# Patient Record
Sex: MALE | Employment: PART TIME | URBAN - METROPOLITAN AREA
[De-identification: names, ages, dates, MRNs, and addresses within clinical notes are randomized per-mention and may not be internally consistent; named-entity substitution may affect disease eponyms.]

---

## 2019-05-21 ENCOUNTER — HOSPITAL ENCOUNTER (INPATIENT)
Age: 19
LOS: 2 days | Discharge: HOME | DRG: 885 | End: 2019-05-23
Attending: PSYCHIATRY & NEUROLOGY | Admitting: PSYCHIATRY & NEUROLOGY
Payer: COMMERCIAL

## 2019-05-21 PROBLEM — F32.A DEPRESSION: Status: ACTIVE | Noted: 2019-05-21

## 2019-05-21 PROCEDURE — 99221 1ST HOSP IP/OBS SF/LOW 40: CPT | Performed by: PHYSICIAN ASSISTANT

## 2019-05-21 PROCEDURE — 99223 1ST HOSP IP/OBS HIGH 75: CPT | Performed by: PSYCHIATRY & NEUROLOGY

## 2019-05-21 PROCEDURE — 6370000000 HC RX 637 (ALT 250 FOR IP): Performed by: PSYCHIATRY & NEUROLOGY

## 2019-05-21 PROCEDURE — 1240000000 HC EMOTIONAL WELLNESS R&B

## 2019-05-21 RX ORDER — CITALOPRAM HYDROBROMIDE 20 MG/10ML
20 SOLUTION, ORAL ORAL DAILY
Status: ON HOLD | COMMUNITY
End: 2019-05-21

## 2019-05-21 RX ORDER — VENLAFAXINE HYDROCHLORIDE 75 MG/1
75 CAPSULE, EXTENDED RELEASE ORAL
Status: DISCONTINUED | OUTPATIENT
Start: 2019-05-21 | End: 2019-05-23 | Stop reason: HOSPADM

## 2019-05-21 RX ORDER — MAGNESIUM HYDROXIDE/ALUMINUM HYDROXICE/SIMETHICONE 120; 1200; 1200 MG/30ML; MG/30ML; MG/30ML
30 SUSPENSION ORAL EVERY 6 HOURS PRN
Status: DISCONTINUED | OUTPATIENT
Start: 2019-05-21 | End: 2019-05-23 | Stop reason: HOSPADM

## 2019-05-21 RX ORDER — ACETAMINOPHEN 325 MG/1
650 TABLET ORAL EVERY 4 HOURS PRN
Status: DISCONTINUED | OUTPATIENT
Start: 2019-05-21 | End: 2019-05-23 | Stop reason: HOSPADM

## 2019-05-21 RX ORDER — HYDROXYZINE PAMOATE 25 MG/1
25 CAPSULE ORAL 3 TIMES DAILY PRN
Status: ON HOLD | COMMUNITY
End: 2019-05-23 | Stop reason: HOSPADM

## 2019-05-21 RX ORDER — HYDROXYZINE PAMOATE 50 MG/1
50 CAPSULE ORAL 3 TIMES DAILY PRN
Status: DISCONTINUED | OUTPATIENT
Start: 2019-05-21 | End: 2019-05-23 | Stop reason: HOSPADM

## 2019-05-21 RX ORDER — OLANZAPINE 5 MG/1
5 TABLET ORAL
Status: ACTIVE | OUTPATIENT
Start: 2019-05-21 | End: 2019-05-21

## 2019-05-21 RX ORDER — CITALOPRAM 20 MG/1
20 TABLET ORAL DAILY
Status: ON HOLD | COMMUNITY
End: 2019-05-23 | Stop reason: HOSPADM

## 2019-05-21 RX ORDER — GABAPENTIN 300 MG/1
300 CAPSULE ORAL 2 TIMES DAILY
Status: DISCONTINUED | OUTPATIENT
Start: 2019-05-21 | End: 2019-05-23 | Stop reason: HOSPADM

## 2019-05-21 RX ORDER — BISACODYL 10 MG
10 SUPPOSITORY, RECTAL RECTAL DAILY PRN
Status: DISCONTINUED | OUTPATIENT
Start: 2019-05-21 | End: 2019-05-23 | Stop reason: HOSPADM

## 2019-05-21 RX ORDER — BUSPIRONE HYDROCHLORIDE 5 MG/1
5 TABLET ORAL 2 TIMES DAILY
Status: ON HOLD | COMMUNITY
End: 2019-05-23 | Stop reason: HOSPADM

## 2019-05-21 RX ORDER — IBUPROFEN 400 MG/1
400 TABLET ORAL EVERY 6 HOURS PRN
Status: DISCONTINUED | OUTPATIENT
Start: 2019-05-21 | End: 2019-05-23 | Stop reason: HOSPADM

## 2019-05-21 RX ORDER — TRAZODONE HYDROCHLORIDE 50 MG/1
50 TABLET ORAL NIGHTLY PRN
Status: DISCONTINUED | OUTPATIENT
Start: 2019-05-21 | End: 2019-05-23 | Stop reason: HOSPADM

## 2019-05-21 RX ADMIN — VENLAFAXINE HYDROCHLORIDE 75 MG: 75 CAPSULE, EXTENDED RELEASE ORAL at 12:20

## 2019-05-21 RX ADMIN — GABAPENTIN 300 MG: 300 CAPSULE ORAL at 12:20

## 2019-05-21 RX ADMIN — GABAPENTIN 300 MG: 300 CAPSULE ORAL at 21:07

## 2019-05-21 RX ADMIN — TRAZODONE HYDROCHLORIDE 50 MG: 50 TABLET ORAL at 21:07

## 2019-05-21 SDOH — HEALTH STABILITY: MENTAL HEALTH: HOW OFTEN DO YOU HAVE A DRINK CONTAINING ALCOHOL?: NEVER

## 2019-05-21 ASSESSMENT — SLEEP AND FATIGUE QUESTIONNAIRES
RESTFUL SLEEP: NO
SLEEP PATTERN: DIFFICULTY FALLING ASLEEP;DIFFICULTY ARISING
SLEEP PATTERN: DIFFICULTY FALLING ASLEEP
DIFFICULTY FALLING ASLEEP: YES
DO YOU HAVE DIFFICULTY SLEEPING: YES
AVERAGE NUMBER OF SLEEP HOURS: 4
DO YOU USE A SLEEP AID: NO
DIFFICULTY STAYING ASLEEP: NO
RESTFUL SLEEP: NO
AVERAGE NUMBER OF SLEEP HOURS: 4
DIFFICULTY FALLING ASLEEP: YES
DIFFICULTY ARISING: YES
DIFFICULTY ARISING: NO
DO YOU HAVE DIFFICULTY SLEEPING: YES
DIFFICULTY STAYING ASLEEP: NO

## 2019-05-21 ASSESSMENT — LIFESTYLE VARIABLES
HISTORY_ALCOHOL_USE: NO
HISTORY_ALCOHOL_USE: NO

## 2019-05-21 ASSESSMENT — PATIENT HEALTH QUESTIONNAIRE - PHQ9: SUM OF ALL RESPONSES TO PHQ QUESTIONS 1-9: 16

## 2019-05-21 NOTE — BH NOTE
Therapist completed psycho social assessment and C-SSRS on Pt. Pt reports no current SI. Pt is employed.   Pt is interested in [facility] but lives too far to attend [facility]'s program.

## 2019-05-21 NOTE — H&P
INITIAL PSYCHIATRIC HISTORY AND PHYSICAL      Patient name: Debbie Abdi  Admit date: 5/21/2019  Today's date: 5/21/2019           CC:  Depression and s/p OD    HPI:   Patient seen in room on Adult Behavioral Unit. Patient is a 22 y.o. male who presents  After he od on his meds. Pt stated that he has been having worsening depression and anxiety for the past 3 days. Pt feels that it has intensify since they switch his meds to buspar and hydroxyzine. Pt stated that he feels dep, anhedonia, dec motivation, inc impulsivity, si fleeting thoughts. No psychosis vani or hypomania reported. Pt stated that main sx is anxiety and he stated that it is a self of impending doom. Pt stated that he always thinks on worst case scenario and he cant get it out of his head. PAST PSYCHIATRIC HISTORY:  No previous admission, no previous attempts but did hit himself a yr ago as a self injurious behaviors    FAMILY PSYCHIATRIC HISTORY: mom has addiction disorder  ALLERGIES:  No Known Allergies    CURRENT MEDICATIONS:     venlafaxine  75 mg Oral Daily with breakfast    gabapentin  300 mg Oral BID       PAST MEDICAL HISTORY:  History reviewed. No pertinent past medical history. PAST SURGICAL HISTORY:  History reviewed. No pertinent surgical history. PROBLEM LIST:  Active Problems:    Depression  Resolved Problems:    * No resolved hospital problems. *       SOCIAL HISTORY:  Pt lives with aunt and uncle and 15 y/o sister, he stays weekend with dad. Pt has GF no kids. Pt works at day care. Hs grad. No hx of abuse.  No legal issues  Social History     Socioeconomic History    Marital status: Single     Spouse name: Not on file    Number of children: Not on file    Years of education: 15    Highest education level: Not on file   Occupational History    Not on file   Social Needs    Financial resource strain: Not on file    Food insecurity:     Worry: Not on file     Inability: Not on file   [employer] needs: Medical: Not on file     Non-medical: Not on file   Tobacco Use    Smoking status: Never Smoker    Smokeless tobacco: Never Used   Substance and Sexual Activity    Alcohol use: Never     Frequency: Never    Drug use: Never    Sexual activity: Yes     Partners: Female   Lifestyle    Physical activity:     Days per week: Not on file     Minutes per session: Not on file    Stress: Not on file   Relationships    Social connections:     Talks on phone: Not on file     Gets together: Not on file     Attends [employer] service: Not on file     Active member of club or organization: Not on file     Attends meetings of clubs or organizations: Not on file     Relationship status: Not on file    Intimate partner violence:     Fear of current or ex partner: Not on file     Emotionally abused: Not on file     Physically abused: Not on file     Forced sexual activity: Not on file   Other Topics Concern    Not on file   Social History Narrative    Not on file       OBJECTIVE:   Vitals:    05/21/19 0703   BP: 127/64   Pulse: (!) 48   Resp: 14   Temp: 97.7 °F (36.5 °C)   SpO2: 97%       REVIEW OF SYSTEMS:   Reports no current cardiovascular, respiratory, gastrointestinal, genitourinary,   integumentary, neurological, musculoskeletal, or immunological symptoms today. PSYCHIATRIC:  See HPI above     PSYCHIATRIC EXAMINATION / MENTAL STATUS EXAM:     CONSTITUTIONAL:    Vitals:    Blood pressure 127/64, pulse (!) 48, temperature 97.7 °F (36.5 °C), temperature source Oral, resp. rate 14, SpO2 97 %.   General appearance:  [x]  appears age, []  appears older than stated age,     [x]  adequately dressed and groomed, [] disheveled,                [x]  in no acute distress, [] appears mildly distressed,      []  Other:      MUSCULOSKELETAL:   Gait:   [x] normal, [] antalgic, [] unsteady, [] in bed, gait not evaluated   Station:  [] erect, [] sitting, [] recumbent, [] other        Strength/tone:  [x] muscle strength and tone appear consistent with age and condition     [] atrophy      [] abnormal movements  PSYCHIATRIC:    Relatedness:  [x] cooperative, [] guarded, [] indifferent, [] hostile,      [] sedated  Speech:  [x] normal prosody, rate and volume [] pressured, [] decreased volume,    [] slurred, [] halting, [] slowed, [] other     [] echolalia, [] incoherent, [] stuttering   Eye contact:  [] direct, [x] avoidant, [] intense  Kinetics:  [] normal, [] increased, [x] decreased  Mood:   [] stable, [x] depressed, [x] anxious, [] irritable,     [] labile  Affect:   [] normal range, [] constricted, [] depressed, [] anxious,     [] angry, [x] blunted  Hallucinations  [x] denies, [] auditory,  [] visual,  [] olfactory, [] tactile  Delusions  [x] none, [] grandiose,  [] jealous,  [] persecutory,  [] somatic,     [] bizarre  Preoccupations  [x] none, [] violence, [] obsessions, [] other     Suicidal ideation  [x] denies, [] endorses  Homicidal ideation [x] denies, [] endorses  Thought process: [x] logical, [] circumstantial, [] tangential, [] ROSA,     [] simplistic, [] disorganized  Associations:  [x] logical and coherent, [] loosening, [] disorganized  Insight:   [] good, [] fair, [x] poor  Judgment:  [] good, [] fair, [x] poor  Attention and concentration:     [] intact, [x] limited, [] able to focus on interview,     [] grossly impaired  Orientation:  [x] person, place, time, situation     [] disoriented to:     Memory:  Remote memory [x] intact, [] impaired     Recent memory  [x] intact, [] impaired          IMPRESSION    Axis I: mdd recurrent moderate, silverio  Axis II: deferred  Axis III: see medical hx  Axis IV: poor coping skills, relational problems      PLAN   1. Admit to Adult Behavioral Unit / Senior Behavioral Unit  2. Consult Internal Medicine to evaluate and treat medical conditions  3. Adjust psychotropic medications to target symptoms stop buspar and hydroxyzine, start effexor and gabapentin  4.   Occupational Therapy, Physical Therapy, Group Psychotherapy as tolerated   5. Reviewed treatment plan with patient including medication risks, benefits, side effects. Obtained informed consent for treatment.        Spent at least 70 minutes with evaluation process and more than 50% of the time was spent obtaining history and planning treatment with the patient

## 2019-05-21 NOTE — BH NOTE
Therapist invited pt to attended the 11:00AM Psychotherapy group; pt declined invitation and did not attend.     Kirt Crowder MA, R-JOSEPT, LPCC-S

## 2019-05-21 NOTE — BH NOTE
No precert information available. No insurance noted in chart unavailable to complete precert. Twan Nunez notified.   Gadiel Gerard BSN, RN-BC

## 2019-05-21 NOTE — PROGRESS NOTES
Patient in [employer] visiting with sister. Gave patient new medictions and reviewed. Patient indicated understanding. Gennaro Randolph

## 2019-05-21 NOTE — H&P
Hospital Medicine History & Physical      PCP: No primary care provider on file. Date of Admission: 5/21/2019    Date of Service: Pt seen/examined on 5/21/2019    Chief Complaint:  SI    History Of Present Illness: The patient is a 22 y.o. male with depression who presented to [facility] ED with complaint of SI and depression. Patient was seen and evaluated in the ED by the ED medical provider, patient was medically cleared for admission to [facility] at [facility]. This note serves as an admission medical H&P.    PCP: No primary care provider on file. Tobacco use: denies   ETOH use: denies   Illicit drug use: denies    Patient denies any medical complaints. Past Medical History:    History reviewed. No pertinent past medical history. Past Surgical History:    History reviewed. No pertinent surgical history. Medications Prior to Admission:    Prior to Admission medications    Medication Sig Start Date End Date Taking? Authorizing Provider   busPIRone (BUSPAR) 5 MG tablet Take 5 mg by mouth 2 times daily   Yes Historical Provider, MD   hydrOXYzine (VISTARIL) 25 MG capsule Take 25 mg by mouth 3 times daily as needed for Anxiety   Yes Historical Provider, MD   citalopram (CELEXA) 20 MG tablet Take 20 mg by mouth daily   Yes Historical Provider, MD       Allergies:  Patient has no known allergies. Social History:  The patient currently lives with aunt and uncle and sister    TOBACCO:   reports that he has never smoked. He has never used smokeless tobacco.  ETOH:   reports that he does not drink alcohol. Family History:   Positive as follows:    History reviewed. No pertinent family history.     REVIEW OF SYSTEMS:     Constitutional: Negative for fever   HENT: Negative for sore throat   Eyes: Negative for redness   Respiratory: Negative  for dyspnea, cough   Cardiovascular: Negative for chest pain   Gastrointestinal: Negative for vomiting, diarrhea   Genitourinary: Negative for hematuria   Musculoskeletal: Negative for arthralgias   Skin: Negative for rash   Neurological: Negative for syncope    Hematological: Negative for easy bruising/bleeding   Psychiatric/Behavorial: Per psychiatry team evaluation     PHYSICAL EXAM:    /64   Pulse (!) 48 Comment: verified manually  Temp 97.7 °F (36.5 °C) (Oral)   Resp 14   SpO2 97%   Gen: No distress. Alert. Eyes: PERRL. No sclera icterus. No conjunctival injection. ENT: No discharge. Pharynx clear. Neck: No JVD. No Carotid Bruit. Trachea midline. Resp: No accessory muscle use. No crackles. No wheezes. No rhonchi. CV: Bradycardia. Regular rhythm. No murmur. No rub. No edema. GI: Non-tender. Non-distended. Normal bowel sounds. Skin: Warm and dry. No nodule on exposed extremities. No rash on exposed extremities. M/S: No cyanosis. No joint deformity. No clubbing. Neuro: Awake. No focal neurologic deficit on exam.  Cranial nerves II through XII intact. Patient is able to ambulate without difficulty.   Psych: Per psychiatry team evaluation     OSH labs wnl     CULTURES  None     EKG:  I have reviewed the EKG with the following interpretation:   OSH--> sinus bradycardia, normal axis, normal interval, no acute ST segment changes     RADIOLOGY  No orders to display       Pertinent previous results reviewed   None    ASSESSMENT/PLAN:  MDD  - per psychiatry team  - Took #5 5mg Buspar tablets around 3 pm on 5/20, still within normal dosing range    - 1/2 life of Buspar is 2-3 hours, now ~ 24 hours post ingestion     Bradycardia   - young, healthy male  - Asx   - monitor     Ramu Stephenson PA-C  5/21/2019 1:35 PM

## 2019-05-21 NOTE — BH NOTE
Writer completed pts leisure assessment. Pt states he spends time with girlfriend, listens to music, plays guitar in his free time. States he has family and girlfriend for support. Pt states that he works at . Pt states that he has anxiety and \"a lot of worries\".  States he began a new depression medication and became suicidal.     Sylvie Eugene MA, Mona Ratliff

## 2019-05-21 NOTE — BH NOTE
[facility]  Initial Interdisciplinary Treatment Plan NOTE    Review Date & Time: 5/21/19 1000    Patient was not in treatment team    Admission Type:   Admission Type: Involuntary    Reason for admission:  Reason for Admission: suicide attempt by overdosing on medications      Estimated Length of Stay Update:  3-5 days  Estimated Discharge Date Update: 5/24/19-5/26/19    PATIENT STRENGTHS:  Patient Strengths Strengths: Communication, No significant Physical Illness, Employment, Medication Compliance, Positive Support  Patient Strengths and Limitations:Limitations: Tendency to isolate self  Addictive Behavior:Addictive Behavior  In the past 3 months, have you felt or has someone told you that you have a problem with:  : None  Do you have a history of Chemical Use?: No  Do you have a history of Alcohol Use?: No  Do you have a history of Street Drug Abuse?: No  Histroy of Prescripton Drug Abuse?: No  Medical Problems:History reviewed. No pertinent past medical history.     EDUCATION:   Learner Progress Toward Treatment Goals: Reviewed goals and plan of care    Method: Individual    Outcome: Verbalized understanding    PATIENT GOALS: none    PLAN/TREATMENT RECOMMENDATIONS UPDATE:evaluate for treatment    GOALS UPDATE:   Time frame for Short-Term Goals: 2 days    Sonu Otto RN

## 2019-05-21 NOTE — BH NOTE
Treatment Center                                           ( ) Patient refused counseling  ( ) Patient has not smoked in the last 30 days    Metabolic Screening:    No results found for: LABA1C    No results found for: CHOL  No results found for: TRIG  No results found for: HDL  No components found for: LDLCAL  No results found for: LABVLDL      There is no height or weight on file to calculate BMI. BP Readings from Last 2 Encounters:   05/21/19 127/64           Pt admitted with followings belongings:  Dentures: None  Vision - Corrective Lenses: None  Jewelry: None  Body Piercings Removed: N/A  Clothing: Pants, Shirt, Socks, Footwear, None(cap)  Were All Patient Medications Collected?: Not Applicable  Other Valuables: Cell phone     Valuables placed in locker. Cell phone in safe. Patient's home medications were none. Patient oriented to surroundings and program expectations and copy of patient rights given. Received admission packet:  yes. Consents reviewed, signed yes. Refused none. Patient verbalize understanding:  yes.     Patient education on precautions: yes                   Devendra Carey RN

## 2019-05-22 PROCEDURE — 6370000000 HC RX 637 (ALT 250 FOR IP): Performed by: PSYCHIATRY & NEUROLOGY

## 2019-05-22 PROCEDURE — 1240000000 HC EMOTIONAL WELLNESS R&B

## 2019-05-22 PROCEDURE — 99233 SBSQ HOSP IP/OBS HIGH 50: CPT | Performed by: PSYCHIATRY & NEUROLOGY

## 2019-05-22 RX ADMIN — GABAPENTIN 300 MG: 300 CAPSULE ORAL at 08:56

## 2019-05-22 RX ADMIN — VENLAFAXINE HYDROCHLORIDE 75 MG: 75 CAPSULE, EXTENDED RELEASE ORAL at 08:56

## 2019-05-22 RX ADMIN — GABAPENTIN 300 MG: 300 CAPSULE ORAL at 21:13

## 2019-05-22 ASSESSMENT — ENCOUNTER SYMPTOMS
SORE THROAT: 0
CONSTIPATION: 0
ABDOMINAL PAIN: 0
BACK PAIN: 0
DIARRHEA: 0
SHORTNESS OF BREATH: 0
CHEST TIGHTNESS: 0
NAUSEA: 0

## 2019-05-22 NOTE — PLAN OF CARE
Problem: Suicide risk  Goal: Provide patient with safe environment  Description  Provide patient with safe environment  Outcome: Ongoing     Problem: Altered Mood, Depressive Behavior:  Goal: Able to verbalize acceptance of life and situations over which he or she has no control  Description  Able to verbalize acceptance of life and situations over which he or she has no control  Outcome: Ongoing  Goal: Able to verbalize and/or display a decrease in depressive symptoms  Description  Able to verbalize and/or display a decrease in depressive symptoms  Outcome: Ongoing   Pt stated that he felt it is beneficial for him to be here. Fels that he is learning better how to cope. States he's learned to focus on self so he can be helpful to others. Denies Si. Has been out on the unit a moderate amount tonight but not yet interacting with others. Cooperative and seems to be getting some insight.

## 2019-05-22 NOTE — PROGRESS NOTES
Department of Psychiatry  Attending Progress Note  Chief Complaint: follow-up Pt stated that been here has been a wake up call for him. He stated that he feels anger towards himself for od and hurting his family. Pt stated that grps have been very helpful. Will cont meds as prescribed. Patient's chart was reviewed and collaborated with  about the treatment plan. SUBJECTIVE:    Patient is feeling better. Suicidal ideation:  denies suicidal ideation. Patient does not have medication side effects. ROS: Patient has new complaints: no  Review of Systems   Constitutional: Negative for activity change and appetite change. HENT: Negative for congestion and sore throat. Eyes: Negative for visual disturbance. Respiratory: Negative for chest tightness and shortness of breath. Cardiovascular: Negative for chest pain. Gastrointestinal: Negative for abdominal pain, constipation, diarrhea and nausea. Musculoskeletal: Negative for back pain and gait problem. Neurological: Negative for dizziness, tremors and headaches. Psychiatric/Behavioral: Positive for behavioral problems. Negative for decreased concentration, sleep disturbance and suicidal ideas. The patient is not nervous/anxious. Sleeping adequately:  Yes   Appetite adequate: Yes  Attending groups: Yes  Visitors:No    OBJECTIVE    Physical  VITALS:  /67   Pulse 77   Temp 97.9 °F (36.6 °C) (Oral)   Resp 14   SpO2 97%     Mental Status Examination:  Patients appearance was well-appearing, street clothes, in chair, good grooming and good hygiene. Thoughts are Logical. Homicidal ideations none. No abnormal movements, tics or mannerisms. Memory intact Aims 0. Concentration Good. Alert and oriented X 4. Insight and Judgement fair. Patient was cooperative.  Patient gait normal. Mood I am mad at myself, affect normal affect Hallucinations Absent, suicidal ideations no specific plan to harm self Speech fluent and

## 2019-05-22 NOTE — PLAN OF CARE
Problem: Suicide risk  Goal: Provide patient with safe environment  Description  Provide patient with safe environment  5/22/2019 1036 by Christiane Orourke RN  Outcome: Ongoing     Problem: Altered Mood, Depressive Behavior:  Goal: Able to verbalize and/or display a decrease in depressive symptoms  Description  Able to verbalize and/or display a decrease in depressive symptoms  5/22/2019 1036 by Christiane Orourke RN  Outcome: Ongoing   S: \"I'm learning some things here about how to cope. I had been dating my girlfriend for a year. \"  O: Visible on the unit and attending group activities. Affect is blunted. Eye contact direct. Talking to family via phone. Med compliant. A: No current suicidal ideation noted. Interactive and cooperative on approach. P: Continue as formulated. 1:1 interactions to administer meds, encourage group attention and discuss pt progress since admission.

## 2019-05-22 NOTE — GROUP NOTE
Group Therapy Note    Date: May 22    Group Start Time: 1000  Group End Time: 1100  Group Topic: Psychoeducation    [facility]    Ruba Hunter        Group Therapy Note    Attendees: 9    Group Topic: Group members were encouraged to engage in an art therapy directive focusing on identifying emotions and situations that cause specific emotions. Pts were encouraged to share their thoughts and feelings with the group in an effort to gain further insight into how to better manage emotions. Notes:  Herson Martinez displayed positive ability to engage in the art therapy directive and the group discussion. He was able to identify specific emotions and discuss challenges related to communicating emotions with others. The group also focused on discussing the role that trust plays in sharing emotions with others. Status After Intervention:  Improved    Participation Level:  Active Listener and Interactive    Participation Quality: Appropriate, Attentive, Sharing and Supportive      Speech:  normal      Thought Process/Content: Logical  Linear      Affective Functioning: Constricted/Restricted      Mood: anxious and depressed  (decreased)    Level of consciousness:  Alert, Oriented x4 and Attentive      Response to Learning: Able to verbalize current knowledge/experience, Able to verbalize/acknowledge new learning, Able to retain information and Progressing to goal      Endings: None Reported    Modes of Intervention: Education, Support, Exploration, Clarifying, Problem-solving and Activity      Discipline Responsible: Psychoeducational Specialist      Signature:  Ryann Riddle MS, ATR-BC

## 2019-05-22 NOTE — PLAN OF CARE
[facility]  Initial Interdisciplinary Treatment Plan NOTE    Review Date & Time: 5/22/19 1000    Patient was not in treatment team    Admission Type:   Admission Type: Involuntary    Reason for admission:  Reason for Admission: suicide attempt by overdosing on medications      Estimated Length of Stay Update:  1-2 days  Estimated Discharge Date Update: 5/22/19-5/23/19    PATIENT STRENGTHS:  Patient Strengths Strengths: Communication, No significant Physical Illness, Employment, Medication Compliance, Positive Support  Patient Strengths and Limitations:Limitations: Tendency to isolate self  Addictive Behavior:Addictive Behavior  In the past 3 months, have you felt or has someone told you that you have a problem with:  : None  Do you have a history of Chemical Use?: No  Do you have a history of Alcohol Use?: No  Do you have a history of Street Drug Abuse?: No  Histroy of Prescripton Drug Abuse?: No  Medical Problems:History reviewed. No pertinent past medical history.     EDUCATION:   Learner Progress Toward Treatment Goals: Reviewed goals and plan of care    Method: Individual    Outcome: Verbalized understanding    PATIENT GOALS: \" have another mental successful day\"    PLAN/TREATMENT RECOMMENDATIONS UPDATE:continue to treat possible discharge    GOALS UPDATE:   Time frame for Short-Term Goals: today    Tomeka Tsai RN

## 2019-05-22 NOTE — GROUP NOTE
Group Therapy Note    Date: May 22    Group Start Time: 1600  Group End Time: 1700  Group Topic: Healthy Living/Wellness    [address]        Group Therapy Note    Attendees: Patients engaged in physical and mental activities such as, playing Wii, free writing, coloring, and socializing with peers          Status After Intervention:  Unchanged    Participation Level: Interactive    Participation Quality: Appropriate      Speech:  normal      Thought Process/Content: Logical      Affective Functioning: Congruent      Mood: euthymic      Level of consciousness:  Alert      Response to Learning: Able to verbalize current knowledge/experience      Endings: None Reported    Modes of Intervention: Socialization, Activity and Movement      Discipline Responsible: Registered Nurse      Signature:  Edis Malin RN

## 2019-05-22 NOTE — GROUP NOTE
Group Therapy Note    Date: May 22    Group Start Time: 1115  Group End Time: 1200  Group Topic: Psychotherapy    [address], LSW; Lion Reese [facility]        Group Therapy Note    Attendees: 8         Patient's Goal:  To open more, become more open minded,and share    Notes:  Pt meet goal in psychotherapy by opening up to the group and actively listening to other group members. Pt was engaged in group and gave supportive feedback and insight to other group members. Status After Intervention:  Improved    Participation Level: Active Listener and Interactive    Participation Quality: Appropriate, Attentive, Sharing and Supportive      Speech:  normal      Thought Process/Content: Logical      Affective Functioning: Constricted/Restricted      Mood: anxious and depressed      Level of consciousness:  Alert      Response to Learning: Able to verbalize current knowledge/experience, Able to verbalize/acknowledge new learning and Able to retain information      Endings: None Reported    Modes of Intervention: Education, Support, Socialization, Exploration, Clarifying and Problem-solving      Discipline Responsible: /Counselor      Signature:   DON Khan

## 2019-05-22 NOTE — GROUP NOTE
Group Therapy Note    Date: May 21    Group Start Time: 2015  Group End Time: 2100  Group Topic: Wrap-Up    [facility]        Group Therapy Note    Attendees:  goals and the importance of goal setting discussed. Night time milieu activities discussed. Patient's Goal:  Learn new coping skills    Notes:   Will play guitar when he catches himself \"chasing his tail\"    Status After Intervention:  Improved    Participation Level: Interactive    Participation Quality: Appropriate      Speech:  normal      Thought Process/Content: Logical      Affective Functioning: Congruent      Mood: good      Level of consciousness:  Alert and Oriented x4      Response to Learning: Progressing to goal      Endings: None Reported    Modes of Intervention: Support      Discipline Responsible: Behavorial Health Tech      Signature:  Vannesa Saeed

## 2019-05-22 NOTE — GROUP NOTE
Group Therapy Note    Date: May 22    Group Start Time: 0115  Group End Time: 0200  Group Topic: Cognitive Skills    [address] [place], [place]        Group Therapy Note    Attendees: 8         Discussed communication skills using DEAR MAN and discussed self soothing techniques in times of high stress    Notes: Pt was fully engaged in group by actively listening to group discussion. Pt contributed to group discussion by giving examples and feedback. Pt took notes in group as well. Status After Intervention:  Improved    Participation Level: Active Listener and Interactive    Participation Quality: Appropriate and Attentive      Speech:  normal      Thought Process/Content: Logical      Affective Functioning: Constricted/Restricted      Mood: anxious      Level of consciousness:  Alert      Response to Learning: Able to verbalize current knowledge/experience, Able to verbalize/acknowledge new learning and Able to retain information      Endings: None Reported    Modes of Intervention: Education, Clarifying and Problem-solving      Discipline Responsible: /Counselor      Signature:   Lonna Jeans, LSW

## 2019-05-23 VITALS
DIASTOLIC BLOOD PRESSURE: 72 MMHG | SYSTOLIC BLOOD PRESSURE: 119 MMHG | RESPIRATION RATE: 14 BRPM | HEART RATE: 75 BPM | TEMPERATURE: 97.9 F | OXYGEN SATURATION: 97 %

## 2019-05-23 PROCEDURE — 99239 HOSP IP/OBS DSCHRG MGMT >30: CPT | Performed by: PSYCHIATRY & NEUROLOGY

## 2019-05-23 PROCEDURE — 6370000000 HC RX 637 (ALT 250 FOR IP): Performed by: PSYCHIATRY & NEUROLOGY

## 2019-05-23 PROCEDURE — 5130000000 HC BRIDGE APPOINTMENT

## 2019-05-23 RX ORDER — GABAPENTIN 300 MG/1
300 CAPSULE ORAL 2 TIMES DAILY
Qty: 60 CAPSULE | Refills: 0 | Status: SHIPPED | OUTPATIENT
Start: 2019-05-23 | End: 2019-06-22

## 2019-05-23 RX ORDER — VENLAFAXINE HYDROCHLORIDE 75 MG/1
75 CAPSULE, EXTENDED RELEASE ORAL
Qty: 30 CAPSULE | Refills: 0 | Status: SHIPPED | OUTPATIENT
Start: 2019-05-24

## 2019-05-23 RX ADMIN — TRAZODONE HYDROCHLORIDE 50 MG: 50 TABLET ORAL at 00:10

## 2019-05-23 RX ADMIN — VENLAFAXINE HYDROCHLORIDE 75 MG: 75 CAPSULE, EXTENDED RELEASE ORAL at 08:28

## 2019-05-23 RX ADMIN — GABAPENTIN 300 MG: 300 CAPSULE ORAL at 08:28

## 2019-05-23 NOTE — PLAN OF CARE
Pt has been visible on unit, very social. Animated, smiled frequently. Pleasant. Stated that had a great day, that was even better than yesterday. Feels old meds didn't work and is very pleased with current med regime, plans to cont'. Said that being her ehas helped, ,that has learned coping skills. Reported that his father will pick him up tomorrow leonard, possibly with aunt, and take home. Heladio Snowball that he is doing so well that he feels that he is \"kind of their drive\" -that other pt look to him and his supportiveness and are motivated. He said that he has helped a lot of others here. A brenda grandiose. 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 Had big smile on face, almost euphoric as talked. Denied SI/HI. Denied hallucinations, paranoia.

## 2019-05-23 NOTE — FLOWSHEET NOTE
Group Therapy Note    Date: 5/23/2019  Start Time: 1330  End Time:  1430  Number of Participants: 9    Type of Group: Music Group    Notes:  Pt present for Music Group. Pt actively participated and was engaged, making song selections and singing along. Participation Level:  Active Listener and Interactive    Participation Quality: Appropriate, Attentive and Sharing      Speech:  normal      Affective Functioning: Congruent      Endings: None Reported    Modes of Intervention: Support, Socialization, Activity and Media      Discipline Responsible: Chaplain Lilliam Mary       05/23/19 1630   Encounter Summary   Services provided to: Patient   Continue Visiting   (5/23 Music Group)   Complexity of Encounter Moderate   Length of Encounter 1 hour

## 2019-05-23 NOTE — GROUP NOTE
Group Therapy Note    Date: May 23    Group Start Time: 1000  Group End Time: [address] [facility]  Group Topic: Psychoeducation    [address] [place], [place]        Group Therapy Note    Attendees: 10    Patient's Goal: To achieve a relaxed state, through aroma therapy and progressive muscle relaxation exercise. Notes: Pt engaged in group activity. Pt was able to achieve a relaxed state. Pt was given resources to materials used in group session. Status After Intervention:  Improved    Participation Level:  Active Listener and Interactive    Participation Quality: Appropriate, Attentive and Sharing      Speech:  normal      Thought Process/Content: Logical      Affective Functioning: Congruent      Mood: depressed      Level of consciousness:  Alert and Oriented x4      Response to Learning: Able to retain information and Capable of insight      Endings: None Reported    Modes of Intervention: Education, Support, Socialization and Activity      Discipline Responsible: Psychoeducational Specialist      Signature:  Sylvie Eugene MA, CTRS

## 2019-05-23 NOTE — DISCHARGE SUMMARY
Discharge Summary   Admit Date: 5/21/2019   Discharge Date:    5/23/2019  Spent over 40 minutes with patient and staff on [address] [facility]   Final Dx:     Axis I: mdd recurrent moderate, silverio  Axis II: deferred  Axis III: see medical hx  Axis IV: poor coping skills, relational problems      Condition on DC  Mood and affect are stable and pt is not suicidal   VITALS:  /72   Pulse 75   Temp 97.9 °F (36.6 °C) (Oral)   Resp 14   SpO2 97%   Brief Summary Present Illness   Patient is a 22 y.o. male who presents  After he od on his meds. Pt stated that he has been having worsening depression and anxiety for the past 3 days. Pt feels that it has intensify since they switch his meds to buspar and hydroxyzine. Pt stated that he feels dep, anhedonia, dec motivation, inc impulsivity, si fleeting thoughts. No psychosis vani or hypomania reported. Pt stated that main sx is anxiety and he stated that it is a self of impending doom. Pt stated that he always thinks on worst case scenario and he cant get it out of his head.        Hospital Course Pt was admitted for depression and od on his meds. Pt stated that it was an impulsive act. Pt was started on effexor and gabapentin which he tolerated well and had good response. Pt attended grps and was social with peers and staff. Patient appears to be in stable condition and close to their baseline functioning. The patient denies suicidal or homicidal ideations and is showing future orientation. Patient no longer presented an imminent risk of danger to themselves and/or others. At the time of discharge it appears that the patient has received the maximum medical benefit from this hospitalization and can be appropriately managed with community treatment. PE: (reviewed) and labs (see medical H&PE)  Labs:    No visits with results within 2 Day(s) from this visit. Latest known visit with results is:   No results found for any previous visit.         Mental Status Exam at Discharge:  Level of consciousness:  awake  Appearance:  well-appearing, in chair, good grooming and good hygiene well-developed, well-nourished  Behavior/Motor:  no abnormalities noted normal gait and station AIMS: 0  Attitude toward examiner:  cooperative, attentive and good eye contact  Speech:  spontaneous, normal rate, normal volume and well articulated  Mood:  dysthymic  Affect:  mood congruent Anxiety: mild  Hallucinations: Absent  Thought processes:  coherent Attention span, Concentration & Attention:  attention span and concentration were age appropriate  Thought content:  Reality based no evidence of delusions OCD: none    Insight: normal insight and judgment Cognition:  oriented to person, place, and time  Fund of Knowledge: average  IQ:average Memory: intact  Suicide:  No specific plan to harm self  Sleep: sleeps through the night  Appetite: ok   Reassess Gl[place]y Risk:  no specific plan to harm self Pt has phone numbers to contact if suicidal thoughts recur and states pt will return to the hospital if suicidal feelings return. Hospital Routine Meds:     venlafaxine  75 mg Oral Daily with breakfast    gabapentin  300 mg Oral BID      Hospital PRN Meds: acetaminophen, ibuprofen, hydrOXYzine, traZODone, magnesium hydroxide, bisacodyl, aluminum & magnesium hydroxide-simethicone   Discharge Meds:    Current Discharge Medication List           Details   venlafaxine (EFFEXOR XR) 75 MG extended release capsule Take 1 capsule by mouth daily (with breakfast)  Qty: 30 capsule, Refills: 0      gabapentin (NEURONTIN) 300 MG capsule Take 1 capsule by mouth 2 times daily for 30 days.   Qty: 60 capsule, Refills: 0                   Disposition - Residence         Follow Up:  See Discharge Instructions

## 2019-05-23 NOTE — BH NOTE
Time: 1600      Type of Group: WELLNESS      Level of Participation: ACTIVE      Comments: INTERACTING APPROPRIATELY

## 2019-05-23 NOTE — PLAN OF CARE
Problem: Suicide risk  Description  Suicide risk  Goal: Provide patient with safe environment  Description  Provide patient with safe environment  Note:   Patient safety maintained. Q15 minute checks continue. Patient denies SI/HI/AVH and is medication compliant this morning. He has elated mood stating \"I feel fantastic, the best vivian felt in years. \"  States he feels he is ready for discharge and would like to continue outpatient services in group setting as he feels this has been beneficial while inpatient. This information given to SW to help provide follow up.

## 2019-05-23 NOTE — GROUP NOTE
Group Therapy Note    Date: May 22    Group Start Time: 2000  Group End Time: 2035  Group Topic: [address] [facility]        Group Therapy Note    Attendees: discussed goals and importance of setting goals. Night time milieu activities discussed.          Patient's Goal:  To overcome some obstacles he's having with his mental health    Notes:  successful    Status After Intervention:  Improved    Participation Level: Interactive    Participation Quality: Appropriate      Speech:  normal      Thought Process/Content: Logical      Affective Functioning: Congruent      Mood: good      Level of consciousness:  Alert and Oriented x4      Response to Learning: Progressing to goal      Endings: None Reported    Modes of Intervention: Support      Discipline Responsible: [email or web address]      Signature:  Lesley May

## 2019-05-23 NOTE — BH NOTE
[address] [facility]  Discharge Note    Pt discharged with followings belongings:   Dentures: None  Vision - Corrective Lenses: None  Jewelry: None  Body Piercings Removed: N/A  Clothing: Pants, Shirt, Socks, Footwear, None(cap)  Were All Patient Medications Collected?: Not Applicable  Other Valuables: Cell phone   Valuables sent home with patient. Valuables retrieved from safe, Security envelope number:  yes and returned to patient. Patient left department with Departure Mode: By self, Family member via Mobility at Departure: Ambulatory, discharged to Discharged to: Private Residence. Patient education on aftercare instructions: yes Patient verbalize understanding of AVS:  yes. Status EXAM upon discharge:  Status and Exam  Normal: Yes  Facial Expression: Brightened  Affect: Congruent  Level of Consciousness: Alert  Mood:Normal: No  Mood: Elated  Motor Activity:Normal: Yes  Interview Behavior: Cooperative  Preception: [place] to Person, [place] to Time, [place] to Situation, [place] to Place  Attention:Normal: Yes  Thought Processes: Circumstantial  Thought Content:Normal: Yes(pt does appear grandiose)  Hallucinations: None  Delusions: No  Memory:Normal: Yes  Insight and Judgment: Yes  Insight and Judgment: Poor Judgment (improving)  Present Suicidal Ideation: No  Present Homicidal Ideation: No      Metabolic Screening:    No results found for: LABA1C    No results found for: CHOL  No results found for: TRIG  No results found for: HDL  No components found for: LDLCAL  No results found for: LABVLDL    Bridge Appointment completed: Reviewed Discharge Instructions with patient. Patient verbalizes understanding and agreement with the discharge plan using the teachback method.      Referral for Outpatient Tobacco Cessation Counseling, upon discharge (elkin X if applicable and completed):    ( )  Hospital staff assisted patient to call Quit Line or faxed referral                                   during hospitalization                  ( )  Recognizing danger situations (included triggers and roadblocks), if not completed on admission                    ( )  Coping skills (new ways to manage stress, exercise, relaxation techniques, changing routine, distraction), if not completed on admission                                                           ( x)  Basic information about quitting (benefits of quitting, techniques in how to quit, available resources, if not completed on admission  ( ) Referral for counseling faxed to [facility]   ( ) Patient refused referral  ( x) Patient refused counseling  ( ) Patient refused smoking cessation medication upon discharge    Vaccinations (elkin X if applicable and completed):  ( ) Patient states already received influenza vaccine elsewhere  ( ) Patient received influenza vaccine during this hospitalization  (x ) Patient refused influenza vaccine at this time    dOin Hdez RN

## 2019-05-23 NOTE — GROUP NOTE
Group Therapy Note    Date: May 23    Group Start Time: 1430  Group End Time: 3546  [facility]        [facility]    Attendees: 8         Patient's Goal:  Patient will complete worksheet on People Pleasing. Will discuss how People Pleasing leads to negative emotions and moods. Notes:  Patient attended group today. Completed worksheet and discussed. He talked about needing to impress people and how this resulted in self-loathing and depressed mood. Status After Intervention:  Improved    Participation Level:  Active Listener    Participation Quality: Appropriate and Attentive      Speech:  normal      Thought Process/Content: Logical      Affective Functioning: Congruent      Mood: anxious and depressed      Level of consciousness:  Oriented x4      Response to Learning: Able to verbalize current knowledge/experience      Endings: None Reported    Modes of Intervention: Education, Support and Socialization      Discipline Responsible: /Counselor      Signature:  Veronica Hudson [facility]